# Patient Record
Sex: FEMALE | Race: WHITE | NOT HISPANIC OR LATINO | Employment: STUDENT | URBAN - METROPOLITAN AREA
[De-identification: names, ages, dates, MRNs, and addresses within clinical notes are randomized per-mention and may not be internally consistent; named-entity substitution may affect disease eponyms.]

---
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
## 2025-01-09 NOTE — TELEPHONE ENCOUNTER
DIAGNOSIS: LEFT ankle, interior pain, no trauma hx or accident, chronic pain    APPOINTMENT DATE: 1/14/25   NOTES STATUS DETAILS   OFFICE NOTE from referring provider Epic - [facility] 11/27/24 - Lillie BAILEY   MEDICATION LIST Wendy

## 2025-01-14 ENCOUNTER — PRE VISIT (OUTPATIENT)
Dept: ORTHOPEDICS | Facility: CLINIC | Age: 20
End: 2025-01-14

## 2025-01-14 ENCOUNTER — OFFICE VISIT (OUTPATIENT)
Dept: ORTHOPEDICS | Facility: CLINIC | Age: 20
End: 2025-01-14
Payer: COMMERCIAL

## 2025-01-14 ENCOUNTER — ANCILLARY PROCEDURE (OUTPATIENT)
Dept: GENERAL RADIOLOGY | Facility: CLINIC | Age: 20
End: 2025-01-14
Attending: STUDENT IN AN ORGANIZED HEALTH CARE EDUCATION/TRAINING PROGRAM
Payer: COMMERCIAL

## 2025-01-14 DIAGNOSIS — M25.571 RIGHT ANKLE PAIN: ICD-10-CM

## 2025-01-14 DIAGNOSIS — M25.571 ACUTE RIGHT ANKLE PAIN: Primary | ICD-10-CM

## 2025-01-14 DIAGNOSIS — M25.572 PAIN IN JOINT, ANKLE AND FOOT, LEFT: ICD-10-CM

## 2025-01-14 PROCEDURE — 99203 OFFICE O/P NEW LOW 30 MIN: CPT | Performed by: STUDENT IN AN ORGANIZED HEALTH CARE EDUCATION/TRAINING PROGRAM

## 2025-01-14 PROCEDURE — 73610 X-RAY EXAM OF ANKLE: CPT | Mod: LT | Performed by: RADIOLOGY

## 2025-01-14 NOTE — PROGRESS NOTES
Sports Medicine Clinic           ASSESSMENT and PLAN:     Diagnoses and all orders for this visit:    Pain in joint, ankle and foot, left  2 months of medial ankle pain now almost fully resolved consistent with PT tendonpathy in the setting of poor shoewear. Has improved her shoe support and is doing much better. We discussed additional treatment options including OTC orthotics, PT, NSAIDs as needed. Given how improved she is she will continue with her current shoewear for now and follow up as needed.    Plan:   Encouraged patient to use orthotics   Avoid using flat shoes like [place] at work   Foot strengthening exercises at home   Reassurance   Indications to follow up discussed     Return sooner if develops new or worsening symptoms.    Options for treatment and/or follow-up care were reviewed with the patient was actively involved in the decision making process. Patient verbalized understanding and was in agreement with the plan.    Mansi Terry MD, [facility]  Primary Care Sports Medicine           SUBJECTIVE       Dionne Moralez is a 19 year old female presenting to clinic today with a chief complaint of left foot pain, referred by self. Started two months ago when patient started a new job in [employer]. Job requires prolonged standing. When medial foot pain started it was worse after work, at that time patient was wearing converse. Patient has since switched to shoes with arc support and symptoms have mostly resolved.     Onset: 2 month(s) ago. After starting new job in [employer] which requires prolonged standing for 8 hours. Patient was wearing flat shoes (converse) when medial ankle joint pain started.   Location of Pain: left medial foot   Rating of Pain at worst: 1/10  Rating of Pain Currently: 0/10  Worsened by: prolonged standing after work if wearing non supportive shoes.   Better with: Rest and wearing shoes with supportive arch   Treatments tried: no treatment tried to date  Associated symptoms: no  distal numbness or tingling; denies swelling or warmth  Orthopedic history: NO  Relevant surgical history: NO  Social history: social history: works at [employer]      PMH, Medications and Allergies were reviewed and updated as needed.    ROS:  As noted above otherwise negative.    Patient Active Problem List   Diagnosis    ARTIE (generalized anxiety disorder)    Parent/child conflict    Severe episode of recurrent major depressive disorder, without psychotic features (H)    Suicidal ideation    Vitamin D deficiency       Current Outpatient Medications   Medication Sig Dispense Refill    cholecalciferol 50 MCG (2000 UT) CAPS Take 2,000 Units by mouth (Patient not taking: Reported on 8/25/2023)      escitalopram (LEXAPRO) 20 MG tablet Take 1 tablet (20 mg) by mouth daily. 90 tablet 3    levonorgestrel-ethinyl estradiol (AVIANE) 0.1-20 MG-MCG tablet Take 1 tablet by mouth daily. Take continuously do not take inactive pill if you do not want period 84 tablet 4    melatonin 3 MG tablet Take 5 tablets by mouth at bedtime. (Patient not taking: Reported on 11/27/2024)      Multiple Vitamins-Minerals (MULTIVITAMIN & MINERAL PO) Take by mouth as needed.      NO ACTIVE MEDICATIONS . (Patient not taking: Reported on 4/17/2023)              OBJECTIVE:       Vitals: There were no vitals filed for this visit.  BMI: There is no height or weight on file to calculate BMI.    Gen:  Well nourished and in no acute distress  HEENT: Extraocular movement intact  Neck: Supple  Pulm:  Breathing Comfortably. No increased respiratory effort.  Psych: Euthymic. Appropriately answers questions    MSK:   LEFT ANKLE  Inspection:    No swelling, redness, edema or ecchymosis is observed  Palpation:    Tender about the exterior tibial tendon. Remainder of bony and ligamentous landmarks are nontender.  Range of Motion:     Plantarflexion full / dorsiflexion full / inversion full / eversion full  Strength:    full  Special Tests:    negative anterior drawer,  negative talar tilt, negative squeeze test. Able to perform heel raise and Able to hop.      Imaging was personally reviewed and interpreted by me.   3 views left ankle radiographs 1/14/2025 12:27 PM     History: Right ankle pain     Comparison: None available     Findings:     Standing AP, oblique, and lateral  views of the left ankle were  obtained.      No acute osseous abnormality.       Ankle mortise and syndesmosis are congruent on weightbearing images.     Soft tissue is unremarkable.                                                                      Impression:  1. No acute osseous abnormality.  2. No substantial degenerative change.

## 2025-01-14 NOTE — LETTER
1/14/2025      RE: Dionne Moralez  [address]  [place] [address]     Dear Colleague,    Thank you for referring your patient, Dionne Moralez, to the [facility] [place]. Please see a copy of my visit note below.    Sports Medicine Clinic           ASSESSMENT and PLAN:     Diagnoses and all orders for this visit:    Pain in joint, ankle and foot, left  2 months of medial ankle pain now almost fully resolved consistent with PT tendonpathy in the setting of poor shoewear. Has improved her shoe support and is doing much better. We discussed additional treatment options including OTC orthotics, PT, NSAIDs as needed. Given how improved she is she will continue with her current shoewear for now and follow up as needed.    Plan:   Encouraged patient to use orthotics   Avoid using flat shoes like [place] at work   Foot strengthening exercises at home   Reassurance   Indications to follow up discussed     Return sooner if develops new or worsening symptoms.    Options for treatment and/or follow-up care were reviewed with the patient was actively involved in the decision making process. Patient verbalized understanding and was in agreement with the plan.    Mansi Terry MD, [facility]  Primary Care Sports Medicine           SUBJECTIVE       Dionne Moralez is a 19 year old female presenting to clinic today with a chief complaint of left foot pain, referred by self. Started two months ago when patient started a new job in [employer]. Job requires prolonged standing. When medial foot pain started it was worse after work, at that time patient was wearing converse. Patient has since switched to shoes with arc support and symptoms have mostly resolved.     Onset: 2 month(s) ago. After starting new job in [employer] which requires prolonged standing for 8 hours. Patient was wearing flat shoes (converse) when medial ankle joint pain started.   Location of Pain: left medial foot   Rating  of Pain at worst: 1/10  Rating of Pain Currently: 0/10  Worsened by: prolonged standing after work if wearing non supportive shoes.   Better with: Rest and wearing shoes with supportive arch   Treatments tried: no treatment tried to date  Associated symptoms: no distal numbness or tingling; denies swelling or warmth  Orthopedic history: NO  Relevant surgical history: NO  Social history: social history: works at [employer]      PMH, Medications and Allergies were reviewed and updated as needed.    ROS:  As noted above otherwise negative.    Patient Active Problem List   Diagnosis     ARTIE (generalized anxiety disorder)     Parent/child conflict     Severe episode of recurrent major depressive disorder, without psychotic features (H)     Suicidal ideation     Vitamin D deficiency       Current Outpatient Medications   Medication Sig Dispense Refill     cholecalciferol 50 MCG (2000 UT) CAPS Take 2,000 Units by mouth (Patient not taking: Reported on 8/25/2023)       escitalopram (LEXAPRO) 20 MG tablet Take 1 tablet (20 mg) by mouth daily. 90 tablet 3     levonorgestrel-ethinyl estradiol (AVIANE) 0.1-20 MG-MCG tablet Take 1 tablet by mouth daily. Take continuously do not take inactive pill if you do not want period 84 tablet 4     melatonin 3 MG tablet Take 5 tablets by mouth at bedtime. (Patient not taking: Reported on 11/27/2024)       Multiple Vitamins-Minerals (MULTIVITAMIN & MINERAL PO) Take by mouth as needed.       NO ACTIVE MEDICATIONS . (Patient not taking: Reported on 4/17/2023)              OBJECTIVE:       Vitals: There were no vitals filed for this visit.  BMI: There is no height or weight on file to calculate BMI.    Gen:  Well nourished and in no acute distress  HEENT: Extraocular movement intact  Neck: Supple  Pulm:  Breathing Comfortably. No increased respiratory effort.  Psych: Euthymic. Appropriately answers questions    MSK:   LEFT ANKLE  Inspection:    No swelling, redness, edema or ecchymosis is  observed  Palpation:    Tender about the exterior tibial tendon. Remainder of bony and ligamentous landmarks are nontender.  Range of Motion:     Plantarflexion full / dorsiflexion full / inversion full / eversion full  Strength:    full  Special Tests:    negative anterior drawer, negative talar tilt, negative squeeze test. Able to perform heel raise and Able to hop.      Imaging was personally reviewed and interpreted by me.   3 views left ankle radiographs 1/14/2025 12:27 PM     History: Right ankle pain     Comparison: None available     Findings:     Standing AP, oblique, and lateral  views of the left ankle were  obtained.      No acute osseous abnormality.       Ankle mortise and syndesmosis are congruent on weightbearing images.     Soft tissue is unremarkable.                                                                      Impression:  1. No acute osseous abnormality.  2. No substantial degenerative change.             Again, thank you for allowing me to participate in the care of your patient.      Sincerely,    Mansi Terry MD

## 2025-03-26 ENCOUNTER — OFFICE VISIT (OUTPATIENT)
Dept: FAMILY MEDICINE | Facility: CLINIC | Age: 20
End: 2025-03-26
Payer: COMMERCIAL

## 2025-03-26 ENCOUNTER — ANCILLARY PROCEDURE (OUTPATIENT)
Dept: GENERAL RADIOLOGY | Facility: CLINIC | Age: 20
End: 2025-03-26
Attending: FAMILY MEDICINE
Payer: COMMERCIAL

## 2025-03-26 VITALS
DIASTOLIC BLOOD PRESSURE: 62 MMHG | WEIGHT: 210.2 LBS | OXYGEN SATURATION: 98 % | HEIGHT: 70 IN | RESPIRATION RATE: 21 BRPM | BODY MASS INDEX: 30.09 KG/M2 | SYSTOLIC BLOOD PRESSURE: 90 MMHG | TEMPERATURE: 98.8 F | HEART RATE: 86 BPM

## 2025-03-26 DIAGNOSIS — G89.29 CHRONIC PAIN OF LEFT KNEE: Primary | ICD-10-CM

## 2025-03-26 DIAGNOSIS — M25.562 CHRONIC PAIN OF LEFT KNEE: Primary | ICD-10-CM

## 2025-03-26 DIAGNOSIS — M25.562 CHRONIC PAIN OF LEFT KNEE: ICD-10-CM

## 2025-03-26 DIAGNOSIS — G89.29 CHRONIC PAIN OF LEFT KNEE: ICD-10-CM

## 2025-03-26 PROCEDURE — 73562 X-RAY EXAM OF KNEE 3: CPT | Mod: TC | Performed by: INTERNAL MEDICINE

## 2025-03-26 PROCEDURE — 3074F SYST BP LT 130 MM HG: CPT | Performed by: FAMILY MEDICINE

## 2025-03-26 PROCEDURE — 3078F DIAST BP <80 MM HG: CPT | Performed by: FAMILY MEDICINE

## 2025-03-26 PROCEDURE — 99214 OFFICE O/P EST MOD 30 MIN: CPT | Performed by: FAMILY MEDICINE

## 2025-03-26 ASSESSMENT — PATIENT HEALTH QUESTIONNAIRE - PHQ9
10. IF YOU CHECKED OFF ANY PROBLEMS, HOW DIFFICULT HAVE THESE PROBLEMS MADE IT FOR YOU TO DO YOUR WORK, TAKE CARE OF THINGS AT HOME, OR GET ALONG WITH OTHER PEOPLE: SOMEWHAT DIFFICULT
SUM OF ALL RESPONSES TO PHQ QUESTIONS 1-9: 12
SUM OF ALL RESPONSES TO PHQ QUESTIONS 1-9: 12

## 2025-03-26 NOTE — PROGRESS NOTES
Assessment & Plan     Chronic pain of left knee    - XR Knee Left 3 Views; Future    Chronic on and off patient reports sometimes feels stiffness around the kneecap for a few days then it gets better.  This time has been for almost a week.  She has no previous injury, no weakness, no swelling.    Reviewed her x-ray with her today, with no acute finding.    Discussed with patient likely patella subluxation.  Advised with supportive care, home daily stretching exercise.  She will wear a knee brace.  RICE therapy    Declined physical therapy today.  She will do home daily stretching and exercise for the next few weeks.  Discussed with patient if no improving, then follow up with PCP, may need MRI.    Depression Screening Follow Up        3/26/2025    10:36 AM   PHQ   PHQ-9 Total Score 12    Q9: Thoughts of better off dead/self-harm past 2 weeks Several days   F/U: Thoughts of suicide or self-harm Yes   F/U: Self harm-plan No   F/U: Self-harm action No   F/U: Safety concerns No       Patient-reported         Subjective   Dionne is a 19 year old, presenting for the following health issues:  Knee Pain    Left knee pain on and off, she reports it does flareup once in a while she does work, she is standing and kneeling.  No previous injury no trauma, no weakness.  She has no swelling, no redness.    Knee Pain    History of Present Illness       Reason for visit:  Knee pain    Dionne eats 2-3 servings of fruits and vegetables daily.Dionne consumes 1 sweetened beverage(s) daily.Dionne exercises with enough effort to increase Dionne's heart rate 30 to 60 minutes per day.  Dionne exercises with enough effort to increase Dionne's heart rate 3 or less days per week.   Dionne is taking medications regularly.          Pain History:  When did you first notice your pain? 1 week , but started a few months ago on and off for a long time now and providers thought it was growing pains  Have you seen anyone else for your pain? No  How has your  "pain affected your ability to work? Pain does not limit ability to work but had to call in on Monday  What type of work do you or did you do? 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 Musculoskeletal problem/pain  Onset/Duration:   Description  Location: knee - left. Has happened to both knees but not at the same time  Joint Swelling: No  Redness: No  Pain: YES  Warmth: No  Intensity:  mild currently 3/10  Progression of Symptoms:  waxing and waning  Accompanying signs and symptoms:   Fevers: No  Numbness/tingling/weakness: No  History  Trauma to the area: No  Recent illness:  No  Previous similar problem: YES  Previous evaluation:  YES- had a scan years ago  Precipitating or alleviating factors:  Aggravating factors include: bending knee and fully extending it  Therapies tried and outcome: ice; not effective        Review of Systems  Constitutional, HEENT, cardiovascular, pulmonary, GI, , musculoskeletal, neuro, skin, endocrine and psych systems are negative, except as otherwise noted.      Objective    BP 90/62 (BP Location: Right arm, Patient Position: Sitting, Cuff Size: Adult Large)   Pulse 86   Temp 98.8  F (37.1  C) (Oral)   Resp 21   Ht 1.772 m (5' 9.78\")   Wt 95.3 kg (210 lb 3.2 oz)   SpO2 98%   BMI 30.35 kg/m    Body mass index is 30.35 kg/m .  Physical Exam   GENERAL: alert and no distress  ORTHO: Knee Exam: Inspection: left knee exam AP/lateral alignment normal  Tender: lateral patellar facet  Non-tender: rest all normal  Active Range of Motion: all normal  Strength: full, normal, no pain.  Special tests: negative Adeola's   SKIN: no suspicious lesions or rashes  PSYCH: mentation appears normal, affect normal/bright    Xray - Reviewed and interpreted by me.  Negative no acute finding.    Orders Placed This Encounter   Procedures    REVIEW OF HEALTH MAINTENANCE PROTOCOL ORDERS    XR Knee Left 3 Views             Signed Electronically by: Christy CARDOSO" MD [place]

## 2025-04-28 DIAGNOSIS — F41.1 GAD (GENERALIZED ANXIETY DISORDER): Primary | ICD-10-CM

## 2025-04-29 DIAGNOSIS — F41.1 GAD (GENERALIZED ANXIETY DISORDER): ICD-10-CM

## 2025-04-29 RX ORDER — ESCITALOPRAM OXALATE 10 MG/1
20 TABLET ORAL
Qty: 60 TABLET | Refills: 5 | Status: SHIPPED | OUTPATIENT
Start: 2025-04-29 | End: 2025-04-29

## 2025-04-29 RX ORDER — ESCITALOPRAM OXALATE 20 MG/1
20 TABLET ORAL DAILY
Qty: 90 TABLET | Refills: 2 | Status: SHIPPED | OUTPATIENT
Start: 2025-04-29

## 2025-05-14 ENCOUNTER — TELEPHONE (OUTPATIENT)
Dept: FAMILY MEDICINE | Facility: CLINIC | Age: 20
End: 2025-05-14
Payer: COMMERCIAL

## 2025-05-14 NOTE — TELEPHONE ENCOUNTER
Patient Quality Outreach    Patient is due for the following:       Topic Date Due    Meningitis B Vaccine (1 of 2 - Standard) Never done    COVID-19 Vaccine (3 - 2024-25 season) 09/01/2024     Chlamydia Screening    Action(s) Taken:   Schedule a office visit for Vaccines and Chlamydia Screening    Type of outreach:    Sent [employer] message.    Questions for provider review:    None         Mago Matta CMA  Chart routed to Care Team.

## 2025-07-24 ENCOUNTER — OFFICE VISIT (OUTPATIENT)
Dept: MIDWIFE SERVICES | Facility: CLINIC | Age: 20
End: 2025-07-24
Payer: COMMERCIAL

## 2025-07-24 VITALS — HEART RATE: 85 BPM | DIASTOLIC BLOOD PRESSURE: 76 MMHG | SYSTOLIC BLOOD PRESSURE: 118 MMHG

## 2025-07-24 DIAGNOSIS — N89.8 VAGINAL DISCHARGE: Primary | ICD-10-CM

## 2025-07-24 DIAGNOSIS — Z11.3 SCREENING EXAMINATION FOR VENEREAL DISEASE: ICD-10-CM

## 2025-07-24 LAB
ALBUMIN UR-MCNC: NEGATIVE MG/DL
APPEARANCE UR: CLEAR
BILIRUB UR QL STRIP: NEGATIVE
CLUE CELLS: ABNORMAL
COLOR UR AUTO: YELLOW
GLUCOSE UR STRIP-MCNC: NEGATIVE MG/DL
HBV SURFACE AB SERPL IA-ACNC: <3.5 M[IU]/ML
HBV SURFACE AB SERPL IA-ACNC: NONREACTIVE M[IU]/ML
HCV AB SERPL QL IA: NONREACTIVE
HGB UR QL STRIP: NEGATIVE
HIV 1+2 AB+HIV1 P24 AG SERPL QL IA: NONREACTIVE
KETONES UR STRIP-MCNC: NEGATIVE MG/DL
LEUKOCYTE ESTERASE UR QL STRIP: ABNORMAL
NITRATE UR QL: NEGATIVE
PH UR STRIP: 7 [PH] (ref 5–8)
SP GR UR STRIP: 1.02 (ref 1–1.03)
T PALLIDUM AB SER QL: NONREACTIVE
TRICHOMONAS, WET PREP: ABNORMAL
UROBILINOGEN UR STRIP-ACNC: 1 E.U./DL
WBC'S/HIGH POWER FIELD, WET PREP: ABNORMAL
YEAST, WET PREP: ABNORMAL

## 2025-07-24 ASSESSMENT — ANXIETY QUESTIONNAIRES
4. TROUBLE RELAXING: SEVERAL DAYS
1. FEELING NERVOUS, ANXIOUS, OR ON EDGE: MORE THAN HALF THE DAYS
GAD7 TOTAL SCORE: 8
IF YOU CHECKED OFF ANY PROBLEMS ON THIS QUESTIONNAIRE, HOW DIFFICULT HAVE THESE PROBLEMS MADE IT FOR YOU TO DO YOUR WORK, TAKE CARE OF THINGS AT HOME, OR GET ALONG WITH OTHER PEOPLE: SOMEWHAT DIFFICULT
7. FEELING AFRAID AS IF SOMETHING AWFUL MIGHT HAPPEN: NOT AT ALL
GAD7 TOTAL SCORE: 8
2. NOT BEING ABLE TO STOP OR CONTROL WORRYING: SEVERAL DAYS
5. BEING SO RESTLESS THAT IT IS HARD TO SIT STILL: SEVERAL DAYS
6. BECOMING EASILY ANNOYED OR IRRITABLE: MORE THAN HALF THE DAYS
3. WORRYING TOO MUCH ABOUT DIFFERENT THINGS: SEVERAL DAYS
8. IF YOU CHECKED OFF ANY PROBLEMS, HOW DIFFICULT HAVE THESE MADE IT FOR YOU TO DO YOUR WORK, TAKE CARE OF THINGS AT HOME, OR GET ALONG WITH OTHER PEOPLE?: SOMEWHAT DIFFICULT
GAD7 TOTAL SCORE: 8
7. FEELING AFRAID AS IF SOMETHING AWFUL MIGHT HAPPEN: NOT AT ALL

## 2025-07-24 NOTE — PROGRESS NOTES
SUBJECTIVE:   Dionne is a 19 year old here for vaginal symptoms:                    Vaginal Symptoms   Onset: few years  Description:  Vaginal Discharge: clear  Itching (Pruritis): No  Burning sensation:  Yes  Odor:  from the discharg  Irritation:  Yes  Accompanying Signs & Symptoms:  Pain with Urination: Yes  Abdominal Pain:  No  Fever: No History:   Sexually active:  Yes  New Partner:  No, AFAB  Possibility of Pregnancy:  No  Contraceptive type: none  Precipitating factors:   Recent Antibiotic Use: No  Alleviating factors:  Not tried anything   Therapies Tried and outcome:   Previous Episodes of Vaginitis:  No    Other associated symptoms: none.  History of STI's:  No  STI Testing offered: YES      LMP: No LMP recorded. (Menstrual status: Birth Control).      Patient Active Problem List   Diagnosis    ARTIE (generalized anxiety disorder)    Parent/child conflict    Severe episode of recurrent major depressive disorder, without psychotic features (H)    Suicidal ideation    Vitamin D deficiency     Past Medical History:   Diagnosis Date    Anxiety     Depression     Dysmenorrhea      Past Surgical History:   Procedure Laterality Date    NO HISTORY OF SURGERY       Current Outpatient Medications   Medication Sig Dispense Refill    escitalopram (LEXAPRO) 20 MG tablet TAKE 1 TABLET BY MOUTH EVERY DAY 90 tablet 2    levonorgestrel-ethinyl estradiol (AVIANE) 0.1-20 MG-MCG tablet Take 1 tablet by mouth daily. Take continuously do not take inactive pill if you do not want period 84 tablet 4    melatonin 3 MG tablet Take 5 tablets by mouth at bedtime. Gummies, not tabs      Multiple Vitamins-Minerals (MULTIVITAMIN & MINERAL PO) Take by mouth as needed.       Allergies   Allergen Reactions    Amoxicillin Hives and Rash           ROS:   10 point ROS of systems including Constitutional, Eyes, Respiratory, Cardiovascular, Gastroenterology, Genitourinary, Integumentary, Muscularskeletal, Psychiatric were all negative except for  pertinent positives noted in my HPI.     PHYSICAL EXAM:    /76 (BP Location: Right arm, Patient Position: Sitting, Cuff Size: Adult Regular)   Pulse 85 , There is no height or weight on file to calculate BMI.  Exam:  Constitutional: healthy, alert, and no distress  Respiratory: negative  Psychiatric: mentation appears normal and affect normal  Abdomen: No pain or abnormalities  Pelvic Exam:  Vulva: No external lesions, normal hair distribution, no adenopathy  Vagina: Moist, erythema at introitus, white discharge, well rugated, no lesions  Rectal exam: Deferred    ASSESSMENT/PLAN:     ICD-10-CM    1. Vaginal discharge  N89.8 Wet prep - Clinic Collect     Urinalysis Macroscopic      2. Screening examination for venereal disease  Z11.3 Vaginal-Chlamydia trachomatis/Neisseria gonorrhoeae by PCR     HIV Antigen Antibody Combo Cascade     Treponema Abs w Reflex to RPR and Titer     Hepatitis C antibody     Hepatitis B Surface Antibody     HIV Antigen Antibody Combo Cascade     Treponema Abs w Reflex to RPR and Titer     Hepatitis C antibody     Hepatitis B Surface Antibody          No results found for any visits on 07/24/25.      COUNSELING:  Discussed treatment depends on results  Reviewed normal vulvar/vaginal hygiene  Follow-up as needed    KARLA Friedman CNM                     Answers submitted by the patient for this visit:  Patient Health Questionnaire (G7) (Submitted on 7/24/2025)  ARTIE 7 TOTAL SCORE: 8